# Patient Record
Sex: MALE | Race: WHITE | NOT HISPANIC OR LATINO | Employment: UNEMPLOYED | URBAN - METROPOLITAN AREA
[De-identification: names, ages, dates, MRNs, and addresses within clinical notes are randomized per-mention and may not be internally consistent; named-entity substitution may affect disease eponyms.]

---

## 2019-04-15 ENCOUNTER — HOSPITAL ENCOUNTER (OUTPATIENT)
Dept: MEDSURG UNIT | Facility: HOSPITAL | Age: 39
End: 2019-04-17
Attending: INTERNAL MEDICINE | Admitting: INTERNAL MEDICINE

## 2019-04-16 LAB
ABS NEUT (OLG): 2.53
ABS NEUT (OLG): 3.62
ALBUMIN SERPL-MCNC: 3.3 GM/DL (ref 3.4–5)
ALBUMIN SERPL-MCNC: 3.5 GM/DL (ref 3.4–5)
ALBUMIN/GLOB SERPL: 1 RATIO (ref 1.1–2)
ALBUMIN/GLOB SERPL: 1 RATIO (ref 1.1–2)
ALP SERPL-CCNC: 74 UNIT/L (ref 46–116)
ALP SERPL-CCNC: 78 UNIT/L (ref 46–116)
ALT SERPL-CCNC: 31 UNIT/L (ref 12–78)
ALT SERPL-CCNC: 45 UNIT/L (ref 12–78)
AMYLASE SERPL-CCNC: 60 UNIT/L (ref 25–115)
AST SERPL-CCNC: 22 UNIT/L (ref 10–37)
AST SERPL-CCNC: 31 UNIT/L (ref 10–37)
BASOPHILS # BLD AUTO: 0.02 X10(3)/MCL
BASOPHILS # BLD AUTO: 0.02 X10(3)/MCL
BASOPHILS NFR BLD AUTO: 0.3 %
BASOPHILS NFR BLD AUTO: 0.4 %
BILIRUB SERPL-MCNC: 2.2 MG/DL (ref 0.2–1)
BILIRUB SERPL-MCNC: 2.4 MG/DL (ref 0.2–1)
BILIRUBIN DIRECT+TOT PNL SERPL-MCNC: 0.49 MG/DL (ref 0–0.2)
BILIRUBIN DIRECT+TOT PNL SERPL-MCNC: 0.55 MG/DL (ref 0–0.2)
BILIRUBIN DIRECT+TOT PNL SERPL-MCNC: 1.65 MG/DL
BILIRUBIN DIRECT+TOT PNL SERPL-MCNC: 1.91 MG/DL
BUN SERPL-MCNC: 12 MG/DL (ref 7–18)
BUN SERPL-MCNC: 12 MG/DL (ref 7–18)
CALCIUM SERPL-MCNC: 7.9 MG/DL (ref 8.5–10.1)
CALCIUM SERPL-MCNC: 8.3 MG/DL (ref 8.5–10.1)
CHLORIDE SERPL-SCNC: 106 MMOL/L (ref 98–107)
CHLORIDE SERPL-SCNC: 107 MMOL/L (ref 98–107)
CO2 SERPL-SCNC: 26.1 MMOL/L (ref 21–32)
CO2 SERPL-SCNC: 26.4 MMOL/L (ref 21–32)
CREAT SERPL-MCNC: 1.19 MG/DL (ref 0.7–1.3)
CREAT SERPL-MCNC: 1.21 MG/DL (ref 0.7–1.3)
EOSINOPHIL # BLD AUTO: 0.09 X10(3)/MCL
EOSINOPHIL # BLD AUTO: 0.17 X10(3)/MCL
EOSINOPHIL NFR BLD AUTO: 1.4 %
EOSINOPHIL NFR BLD AUTO: 3.3 %
ERYTHROCYTE [DISTWIDTH] IN BLOOD BY AUTOMATED COUNT: 13 %
ERYTHROCYTE [DISTWIDTH] IN BLOOD BY AUTOMATED COUNT: 13 %
GLOBULIN SER-MCNC: 3.3 GM/DL (ref 2.4–3.5)
GLOBULIN SER-MCNC: 3.5 GM/DL (ref 2.4–3.5)
GLUCOSE SERPL-MCNC: 98 MG/DL (ref 74–106)
GLUCOSE SERPL-MCNC: 99 MG/DL (ref 74–106)
HCT VFR BLD AUTO: 44.5 % (ref 39–49)
HCT VFR BLD AUTO: 46.1 % (ref 39–49)
HGB BLD-MCNC: 15.1 GM/DL (ref 12.6–16.6)
HGB BLD-MCNC: 16 GM/DL (ref 12.6–16.6)
IMM GRANULOCYTES # BLD AUTO: 0 10*3/UL (ref 0–0.1)
IMM GRANULOCYTES # BLD AUTO: 0.01 10*3/UL (ref 0–0.1)
IMM GRANULOCYTES NFR BLD AUTO: 0 % (ref 0–1)
IMM GRANULOCYTES NFR BLD AUTO: 0.2 % (ref 0–1)
LACTATE SERPL-SCNC: 0.8 MMOL/L (ref 0.4–2)
LDH SERPL-CCNC: 175 UNIT/L (ref 87–241)
LIPASE SERPL-CCNC: 97 UNIT/L (ref 73–393)
LYMPHOCYTES # BLD AUTO: 1.89 X10(3)/MCL
LYMPHOCYTES # BLD AUTO: 1.9 X10(3)/MCL
LYMPHOCYTES NFR BLD AUTO: 30.3 %
LYMPHOCYTES NFR BLD AUTO: 37.2 %
MCH RBC QN AUTO: 29.4 PG (ref 27–33)
MCH RBC QN AUTO: 29.7 PG (ref 27–33)
MCHC RBC AUTO-ENTMCNC: 33.9 GM/DL (ref 32–35)
MCHC RBC AUTO-ENTMCNC: 34.7 GM/DL (ref 32–35)
MCV RBC AUTO: 85.5 FL (ref 84–97)
MCV RBC AUTO: 86.6 FL (ref 84–97)
MONOCYTES # BLD AUTO: 0.49 X10(3)/MCL
MONOCYTES # BLD AUTO: 0.6 X10(3)/MCL
MONOCYTES NFR BLD AUTO: 9.6 %
MONOCYTES NFR BLD AUTO: 9.6 %
NEUTROPHILS # BLD AUTO: 2.53 X10(3)/MCL
NEUTROPHILS # BLD AUTO: 3.62 X10(3)/MCL
NEUTROPHILS NFR BLD AUTO: 49.5 %
NEUTROPHILS NFR BLD AUTO: 58.2 %
PLATELET # BLD AUTO: 206 X10(3)/MCL (ref 151–368)
PLATELET # BLD AUTO: 222 X10(3)/MCL (ref 151–368)
PMV BLD AUTO: 9 FL
PMV BLD AUTO: 9 FL
POTASSIUM SERPL-SCNC: 3.8 MMOL/L (ref 3.5–5.1)
POTASSIUM SERPL-SCNC: 4 MMOL/L (ref 3.5–5.1)
PROT SERPL-MCNC: 6.6 GM/DL (ref 6.4–8.2)
PROT SERPL-MCNC: 7 GM/DL (ref 6.4–8.2)
RBC # BLD AUTO: 5.14 X10(6)/MCL (ref 4.3–5.6)
RBC # BLD AUTO: 5.39 X10(6)/MCL (ref 4.3–5.6)
SODIUM SERPL-SCNC: 139 MMOL/L (ref 136–145)
SODIUM SERPL-SCNC: 141 MMOL/L (ref 136–145)
WBC # SPEC AUTO: 5.11 X10(3)/MCL (ref 3.4–9.2)
WBC # SPEC AUTO: 6.23 X10(3)/MCL (ref 3.4–9.2)

## 2019-04-17 LAB
ALBUMIN SERPL-MCNC: 3.2 GM/DL (ref 3.4–5)
ALP SERPL-CCNC: 69 UNIT/L (ref 46–116)
ALT SERPL-CCNC: 38 UNIT/L (ref 12–78)
AST SERPL-CCNC: 23 UNIT/L (ref 10–37)
BILIRUB SERPL-MCNC: 1.5 MG/DL (ref 0.2–1)
BILIRUBIN DIRECT+TOT PNL SERPL-MCNC: 0.33 MG/DL (ref 0–0.2)
BILIRUBIN DIRECT+TOT PNL SERPL-MCNC: 1.17 MG/DL
CRP SERPL-MCNC: 1.9 MG/DL
ERYTHROCYTE [SEDIMENTATION RATE] IN BLOOD: >112 MM/HR (ref 0–20)
PROT SERPL-MCNC: 6.5 GM/DL (ref 6.4–8.2)

## 2020-11-09 ENCOUNTER — HISTORICAL (OUTPATIENT)
Dept: OCCUPATIONAL THERAPY | Facility: HOSPITAL | Age: 40
End: 2020-11-09

## 2020-11-12 ENCOUNTER — HISTORICAL (OUTPATIENT)
Dept: OCCUPATIONAL THERAPY | Facility: HOSPITAL | Age: 40
End: 2020-11-12

## 2020-11-19 ENCOUNTER — HISTORICAL (OUTPATIENT)
Dept: OCCUPATIONAL THERAPY | Facility: HOSPITAL | Age: 40
End: 2020-11-19

## 2020-12-03 ENCOUNTER — HISTORICAL (OUTPATIENT)
Dept: OCCUPATIONAL THERAPY | Facility: HOSPITAL | Age: 40
End: 2020-12-03

## 2020-12-17 ENCOUNTER — HISTORICAL (OUTPATIENT)
Dept: ADMINISTRATIVE | Facility: HOSPITAL | Age: 40
End: 2020-12-17

## 2022-04-30 VITALS
DIASTOLIC BLOOD PRESSURE: 92 MMHG | WEIGHT: 272.5 LBS | SYSTOLIC BLOOD PRESSURE: 142 MMHG | HEIGHT: 69 IN | BODY MASS INDEX: 40.36 KG/M2
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
## 2022-04-30 NOTE — H&P
Final Report *  Freetext Note *     Patient:   Galo Raymundo             MRN: [id number]            FIN: [id number]               Age:   38 years     Sex:  Male     :  1980   Associated Diagnoses:   None   Author:   Yvonne HUBBARD, Andi PRIEST      Brief note  Patient seen and examined by me via telemedicine consent was obtained  38-year-old white male with history of GERD who presented 2-day history of right-sided abdominal pain started at work with fever initially and subsequently with abdominal pain a lot of belching and burping some nausea no vomiting last bowel movement was yesterday which had some maroon color or dark blood in stool he does have hemorrhoid.  He had taken some Tylenol, is reporting that had some night sweats yesterday.  Denies any NSAID use, any history of ulcerative colitis or family history of IBD.  Past medical history significant for GERD  Vital signs  Temperature: 36.8, pulse 10 3 repeat pulse is 79, respiratory rate 18, blood pressure initially 162/108, currently is 147/90, O2 saturation 98% room air  General middle-aged gentleman not in acute distress  Respiratory exam is clear to auscultation bilaterally  Cardia vascular S1-S2 regular rhythm  Abdomen is soft positive bowel sounds with tenderness on the right no rebound, rectal exam was deferred  Extremity no signs no clubbing no edema  Neuro exam is nonfocal  Laboratory findings reviewed and normal except for T bili of 3.2, direct bili of 0.56, H&H is 17.7 and 50.2, UA is negative except for 4-10 WBCs, UDS is negative  Abdominal CT without contrast noted with some inflammatory nodes otherwise benign  Assessment plan  Abdominal pain with fever unclear etiology questionable for mild gastroenteritis, cannot rule out IBD, repeat CMP, hydrate and give morphine for pain.  Surgery to evaluate in a.m.  -Lower GI bleed questionable for hemorrhoidal bleed bleed patient need colonoscopy plus or minus upper endoscopy  GERD continue on  PPI  Dehydration continue with hydration repeat labs  DVT prophylaxis with SCDs     Result type: Progress Note-Physician  Result date: April 15, 2019 22:54 CDT  Result status: Auth (Verified)  Result title: Freetext Note *  Performed by: Andi Russell MD on April 15, 2019 22:54 CDT  Verified by: Andi Russell MD on April 15, 2019 22:54 CDT  Encounter info: [id number], Nik Lara, Observation, 4/15/2019 - 4/17/2019    copied and pasted to H&P per [facility] him dept, ms

## 2022-05-02 NOTE — HISTORICAL [facility]
This is a historical note converted from [place]. Formatting and pictures may have been removed.  Please reference [place] for original formatting and attached multimedia. Admit and Discharge Dates  Admit Date: 04/15/2019  Discharge Date: 04/16/2019  ?  Physicians  Attending Physician - Yvonne HUBBARD, Andi PRIEST  Attending Physician - Traci HUBBARD, Florian ALVARADO  Admitting Physician - Yvonne HUBBARD, Andi PRIEST  Primary Care Physician - Mini KUO, Deepak  ?  Discharge Diagnosis  1.?Abdominal pain?R10.9  2.?Chronic GERD?K21.9  3.?Asthma?J45.909  4.?Depression?F32.9  Surgical Procedures  No procedures recorded for this visit.  Immunizations  No immunizations recorded for this visit.  ?  Admission Information  38-year-old white male with history of GERD who presented 2-day history of right-sided abdominal pain started at work with fever initially and subsequently with abdominal pain a lot of belching and burping some nausea no vomiting last bowel movement was yesterday which had some maroon color or dark blood in stool he does have hemorrhoid. ?He had taken some Tylenol, is reporting that had some night sweats yesterday. ?Denies any NSAID use, any history of ulcerative colitis or family history of IBD. He was given IVF and admitted for 23 hour observation.  ?  Hospital Course  Patients clinical course?was uneventful.? He was started on IV?fluids?and his H&H was trended.? It remained stable.? CT of the abdomen?was negative.??His UDS was negative.? Abdominal ultrasound showed some mild splenomegaly?but no stones.? He remained afebrile during his admission?and his labs otherwise remained normal.? He was able to tolerate advancement in diet.? He was therefore medically clear for discharge?with?with follow-up instructions and prescriptions.  ?  D/C Time: 37 minutes  Significant Findings  (04/15/2019 18:52 CDT CT Abdomen and Pelvis W/O Contrast)  Reason For Exam  Abdominal Pain  ?  Radiology Report  CT ABDOMEN AND PELVIS WITHOUT CONTRAST:  2-D  reformations provided  ?  HISTORY: Abdominal Pain ?; correlation with prior study from February 2012  ?  As per PQRS measures, all CT scans at this facility used dose  modulation, iterative reconstruction, and/or weight based dose  adjustment when appropriate to reduce radiation dose to as low as  reasonably achievable.  ?  FINDINGS:?  The appendix is normal caliber with no inflammatory change identified.  No dilated bowel or sizable ascites.  ?  The unenhanced liver is normal size. No obvious biliary pathology. The  unenhanced pancreas, spleen, adrenal glands and kidneys are  unremarkable.  ?  The partially distended urinary bladder is unremarkable. The prostate  gland is normal size and contains calcification. Minimal stranding at  the root of the mesentery with reactive lymph nodes is stable.  Abdominal aorta is normal caliber. Multiple small ventral abdominal  wall fat-containing hernias reidentified  ?  IMPRESSION: No acute abdominopelvic finding or detrimental change  ? [1]  ?  ?  (04/16/2019 12:25 CDT US Abdomen Complete)  Reason For Exam  RIGHT SIDED ABDOMINAL PAIN;Abdominal Pain  ?  Radiology Report  ?  History.  Right-sided pain  ?  Reference.  CT yesterday.  ?  Findings.  Grayscale, color and spectral Doppler evaluation of the abdomen.  ?  No focal abnormality of the limited visualized pancreas.  ?  Visualized portions of the aorta and inferior vena cava are normal in  caliber. ?  ?  The liver is not significantly enlarged. There is no focal liver  lesion. Normal hepatopetal flow is noted in the portal vein.?  ?  The gallbladder is free of stones. No gallbladder wall thickening or  pericholecystic fluid. The common bile duct is normal in caliber and  measures 5 mm.?  ?  The right kidney measures 12 cm, while the left measures 12 cm. There  is no hydronephrosis or solid renal mass.?  ?  Spleen is mildly enlarged measuring 13-14 cm.  ?  Impression:?  1. Splenomegaly.  2. No other significant  abnormality.  ? [2]  Objective  Vitals & Measurements  T:?36.5? ?C (Oral)? TMIN:?26.6? ?C (Oral)? TMAX:?37.7? ?C (Oral)? HR:?76(Peripheral)? RR:?18? BP:?118/83? SpO2:?96%? WT:?118.5?kg?  Physical Exam  General: Alert, laying in bed, in no acute distress  Eyes: EOMI, no scleral icterus  HEENT: NCAT, oral mucosa moist, neck supple  CV: RRR  Respiratory: CTA bilaterally  GI: + BS, soft, mild right upper quadrant?and epigastric tenderness to palpation, nondistended  : No CVA or suprapubic tenderness palpation  MSK/extremities: No pitting edema, cyanosis, or clubbing.? MAEW  Neuro: GCS 15  Psych: Calm, cooperative with exam.? Mood and affect appropriate.  Integument: Warm, dry  Patient Discharge Condition  Stable  Discharge Disposition  Home   Discharge Medication Reconciliation  Prescribed  acetaminophen-HYDROcodone (Norco 5 mg-325 mg oral tablet)?1 tab(s), Oral, q4hr, PRN for pain  omeprazole (omeprazole 40 mg oral DR capsule)?40 mg, Oral, BID  ondansetron (Zofran ODT 4 mg oral tablet, disintegrating)?4 mg, Oral, q8hr, PRN as needed for nausea/vomiting  Continue  albuterol (VENTOLIN HFA AER)  famotidine (FAMOTIDINE ? TAB 10MG)?10 mg, Oral, BID  traZODone (TRAZODONE ? ?TAB 100MG)?100 mg, Oral, qPM  Discontinue  omeprazole (OMEPRAZOLE ? CAP 20MG)?20 mg, Oral, Daily  ?  Education and Orders Provided  Abdominal Pain, Adult, Easy-to-Read  Food Choices for Gastroesophageal Reflux Disease, Adult  Gastroesophageal Reflux Disease, Adult  Discharge - 04/16/19 13:44:00 CDT, Home Pending setup for surgery evaluation for EGD?  Discharge Activity - Activity as Tolerated?  Discharge Diet - Regular?  ?  Follow up  Follow up with Arranged Physician, on 05/02/2019  Follow up with Dr Cesar Cheung on 5/2/19 at 0645 for evaluation of abdominal pain  Anytime the conditions worsen, return to clinic or go to ED  Follow-up with PCP in 3 to 5 days  ?     [1]?CT Abdomen and Pelvis W/O Contrast; Raffaele HUBBADR, Rachel Alarcon 04/15/2019 18:52  CDT  [2]?US Abdomen Complete; Karie HUBBARD, Ned Parker 04/16/2019 12:25 CDT
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
## 2022-11-14 ENCOUNTER — HOSPITAL ENCOUNTER (EMERGENCY)
Facility: HOSPITAL | Age: 42
Discharge: HOME | End: 2022-11-14
Attending: EMERGENCY MEDICINE
Payer: MEDICAID

## 2022-11-14 VITALS
DIASTOLIC BLOOD PRESSURE: 97 MMHG | HEART RATE: 120 BPM | WEIGHT: 270 LBS | TEMPERATURE: 100 F | HEIGHT: 69 IN | SYSTOLIC BLOOD PRESSURE: 146 MMHG | RESPIRATION RATE: 20 BRPM | OXYGEN SATURATION: 94 % | BODY MASS INDEX: 39.99 KG/M2

## 2022-11-14 DIAGNOSIS — R50.9 FEVER: ICD-10-CM

## 2022-11-14 DIAGNOSIS — Z20.828 RSV EXPOSURE: ICD-10-CM

## 2022-11-14 DIAGNOSIS — B34.9 ACUTE VIRAL SYNDROME: Primary | ICD-10-CM

## 2022-11-14 LAB
FLUAV AG UPPER RESP QL IA.RAPID: NOT DETECTED
FLUBV AG UPPER RESP QL IA.RAPID: NOT DETECTED
SARS-COV-2 RNA RESP QL NAA+PROBE: NOT DETECTED

## 2022-11-14 PROCEDURE — 25000003 PHARM REV CODE 250: Performed by: EMERGENCY MEDICINE

## 2022-11-14 PROCEDURE — 0240U COVID/FLU A&B PCR: CPT | Performed by: EMERGENCY MEDICINE

## 2022-11-14 PROCEDURE — 99284 EMERGENCY DEPT VISIT MOD MDM: CPT | Mod: 25

## 2022-11-14 RX ORDER — ACETAMINOPHEN 500 MG
1000 TABLET ORAL
Status: COMPLETED | OUTPATIENT
Start: 2022-11-14 | End: 2022-11-14

## 2022-11-14 RX ADMIN — ACETAMINOPHEN 1000 MG: 500 TABLET, FILM COATED ORAL at 07:11

## 2022-11-14 NOTE — ED NOTES
Pt to Ed with c/o cough, congestion and fever since yesterday. Non productive cough, sore throat. No SOB noted.

## 2022-11-14 NOTE — Clinical Note
"Galo Rivas"Zackary was seen and treated in our emergency department on 11/14/2022.  He may return to work on 11/16/2022.       If you have any questions or concerns, please don't hesitate to call.      Danie Robin MD"

## 2022-11-14 NOTE — ED PROVIDER NOTES
Encounter Date: 11/14/2022       History     Chief Complaint   Patient presents with    Cough    Fever    Headache    Influenza     Cough, congestion, fever and headache started yesterday,. Kids positive for rsv     Patient is a 41-year-old male who presents today with complaints of cough, rhinorrhea, nasal congestion, nausea, fever, headaches, abdominal pain, dyspnea on exertion, x2 days.  Denies sore throat, neck pain/stiffness.  Patient has multiple children who recently tested positive for RSV.  T-max 104° last night.  Last antipyretic taken 9 hours ago.  No prior evaluation.    Review of patient's allergies indicates:  No Known Allergies  Past Medical History:   Diagnosis Date    Anxiety disorder, unspecified     Hypercholesteremia      History reviewed. No pertinent surgical history.  History reviewed. No pertinent family history.  Social History     Tobacco Use    Smoking status: Never    Smokeless tobacco: Never   Substance Use Topics    Alcohol use: Never    Drug use: Never     Review of Systems   Constitutional:  Positive for chills and fever. Negative for diaphoresis.   HENT:  Positive for congestion and rhinorrhea. Negative for sore throat.    Eyes:  Negative for pain, redness and visual disturbance.   Respiratory:  Positive for cough and shortness of breath.    Cardiovascular:  Negative for chest pain, palpitations and leg swelling.   Gastrointestinal:  Positive for abdominal pain and nausea. Negative for abdominal distention, blood in stool, constipation, diarrhea and vomiting.   Genitourinary:  Negative for dysuria and hematuria.   Musculoskeletal:  Negative for arthralgias and joint swelling.   Skin:  Negative for rash and wound.   Neurological:  Negative for seizures, syncope and headaches.   All other systems reviewed and are negative.    Physical Exam     Initial Vitals [11/14/22 0717]   BP Pulse Resp Temp SpO2   (!) 151/98 (!) 133 20 98.6 °F (37 °C) 97 %      MAP       --         Physical  Exam    Nursing note and vitals reviewed.  Constitutional: He appears well-developed and well-nourished. No distress.   HENT:   Head: Normocephalic and atraumatic.   Mouth/Throat: Oropharynx is clear and moist.   Eyes: Conjunctivae and EOM are normal. Pupils are equal, round, and reactive to light.   Neck: Neck supple. No tracheal deviation present.   Cardiovascular:  Regular rhythm, normal heart sounds and intact distal pulses.           tachycardic   Pulmonary/Chest: Breath sounds normal. No respiratory distress.   Abdominal: Abdomen is soft. He exhibits no distension. There is no abdominal tenderness. There is no rebound and no guarding.   Musculoskeletal:         General: No tenderness or edema. Normal range of motion.      Cervical back: Neck supple.     Neurological: He is alert and oriented to person, place, and time. GCS score is 15. GCS eye subscore is 4. GCS verbal subscore is 5. GCS motor subscore is 6.   No focal deficits   Skin: Skin is warm. No rash noted. No erythema.   Psychiatric: He has a normal mood and affect. His behavior is normal.       ED Course   Procedures  Labs Reviewed   COVID/FLU A&B PCR - Normal    Narrative:     The Xpert Xpress SARS-CoV-2/FLU/RSV plus is a rapid, multiplexed real-time PCR test intended for the simultaneous qualitative detection and differentiation of SARS-CoV-2, Influenza A, Influenza B, and respiratory syncytial virus (RSV) viral RNA in either nasopharyngeal swab or nasal swab specimens.           Results for orders placed or performed during the hospital encounter of 11/14/22   COVID/FLU A&B PCR   Result Value Ref Range    Influenza A PCR Not Detected Not Detected    Influenza B PCR Not Detected Not Detected    SARS-CoV-2 PCR Not Detected Not Detected            Imaging Results              X-Ray Chest PA And Lateral (Final result)  Result time 11/14/22 08:20:30      Final result by Denzel Dubon MD (11/14/22 08:20:30)                   Impression:      No acute  "cardiopulmonary process identified.      Electronically signed by: Denzel Dubon  Date:    11/14/2022  Time:    08:20               Narrative:    EXAMINATION:  XR CHEST PA AND LATERAL    CLINICAL HISTORY:  Fever, unspecified    TECHNIQUE:  Two-view    COMPARISON:  May 18, 2021.    FINDINGS:  Cardiopericardial silhouette is within normal limits. Lungs are without dense focal or segmental consolidation, congestion, pleural effusion or pneumothorax.                                       Medications   acetaminophen tablet 1,000 mg (1,000 mg Oral Given 11/14/22 0753)     Vitals:    11/14/22 0717 11/14/22 0751   BP: (!) 151/98 (!) 167/97   BP Location: Right arm    Patient Position: Lying    Pulse: (!) 133 (!) 125   Resp: 20 20   Temp: 98.6 °F (37 °C) (!) 102.5 °F (39.2 °C)   TempSrc: Oral Oral   SpO2: 97% 97%   Weight: 122.5 kg (270 lb)    Height: 5' 9" (1.753 m)        Medical Decision Making:   Multiple children live just tested positive for RSV.  One 3-month-old was hospitalized.  Now patient comes in with acute viral syndrome symptoms.  COVID and influenza negative.  Suspect RSV given the known recent exposure.  Lung exam clear to auscultation.  Chest x-ray shows no pneumonia.  Discussed symptomatic management and follow-up primary care physician.  ER return precautions provided           ED Course as of 11/14/22 0838   Mon Nov 14, 2022   0750 Repeat temp 102.5, which would explain the tachycardia. Will give tylenol [DP]      ED Course User Index  [DP] Danie Robin MD                 Clinical Impression:   Final diagnoses:  [R50.9] Fever  [B34.9] Acute viral syndrome (Primary)  [Z20.828] RSV exposure      ED Disposition Condition    Discharge Stable          ED Prescriptions    None       Follow-up Information       Follow up With Specialties Details Why Contact Info    [facility] - Emergency Dept Emergency Medicine  As needed, If symptoms worsen [address] [facility] [id number] [phone] "             Danie Robin MD  11/14/22 0840